# Patient Record
Sex: FEMALE | Race: WHITE
[De-identification: names, ages, dates, MRNs, and addresses within clinical notes are randomized per-mention and may not be internally consistent; named-entity substitution may affect disease eponyms.]

---

## 2019-10-25 ENCOUNTER — HOSPITAL ENCOUNTER (INPATIENT)
Dept: HOSPITAL 35 - SBH | Age: 84
LOS: 10 days | Discharge: NURSING, REHAB OR LONG-TERM CARE | DRG: 57 | End: 2019-11-04
Attending: PSYCHIATRY & NEUROLOGY | Admitting: PSYCHIATRY & NEUROLOGY
Payer: COMMERCIAL

## 2019-10-25 VITALS — BODY MASS INDEX: 22.86 KG/M2 | HEIGHT: 67 IN | WEIGHT: 145.63 LBS

## 2019-10-25 VITALS — DIASTOLIC BLOOD PRESSURE: 90 MMHG | SYSTOLIC BLOOD PRESSURE: 170 MMHG

## 2019-10-25 DIAGNOSIS — M81.0: ICD-10-CM

## 2019-10-25 DIAGNOSIS — Z88.7: ICD-10-CM

## 2019-10-25 DIAGNOSIS — Z88.0: ICD-10-CM

## 2019-10-25 DIAGNOSIS — I10: ICD-10-CM

## 2019-10-25 DIAGNOSIS — K21.9: ICD-10-CM

## 2019-10-25 DIAGNOSIS — Z88.1: ICD-10-CM

## 2019-10-25 DIAGNOSIS — Z23: ICD-10-CM

## 2019-10-25 DIAGNOSIS — F02.81: ICD-10-CM

## 2019-10-25 DIAGNOSIS — G30.9: Primary | ICD-10-CM

## 2019-10-25 DIAGNOSIS — F01.50: ICD-10-CM

## 2019-10-25 DIAGNOSIS — Z88.8: ICD-10-CM

## 2019-10-25 DIAGNOSIS — Z79.82: ICD-10-CM

## 2019-10-25 DIAGNOSIS — Z79.899: ICD-10-CM

## 2019-10-25 DIAGNOSIS — Z91.041: ICD-10-CM

## 2019-10-25 PROCEDURE — 10880: CPT

## 2019-10-26 VITALS — DIASTOLIC BLOOD PRESSURE: 85 MMHG | SYSTOLIC BLOOD PRESSURE: 135 MMHG

## 2019-10-26 VITALS — SYSTOLIC BLOOD PRESSURE: 131 MMHG | DIASTOLIC BLOOD PRESSURE: 77 MMHG

## 2019-10-26 VITALS — DIASTOLIC BLOOD PRESSURE: 82 MMHG | SYSTOLIC BLOOD PRESSURE: 143 MMHG

## 2019-10-26 VITALS — SYSTOLIC BLOOD PRESSURE: 143 MMHG | DIASTOLIC BLOOD PRESSURE: 82 MMHG

## 2019-10-26 NOTE — NUR
To toilet with assistance twice.
Awake and alert, orientated to self and place.
Denies pain, SI/HI.  Breath sounds clear t/o, bilaterally equal.  Color pink
with brisk capillary refill and palpable peripheral pulses. Regular HR
auscultated.  Active bowel sounds over soft, rounded abdomen.  Able to
ambulate a few steps in bathroom with support rails.  Family members here
visiting, appropriate questions and concerns.  Able to feed self at lunch.
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
## 2019-10-26 NOTE — NUR
Alert and cooperative.  Orientated x3.  No s/o distress.  Denies SI/HI, pain.
Conversive with peers and staff.  At times encouraging roomate to get out of
bed and dress for lunch.  Breath sounds clear t/o, bilaterally equal.  Color
pink with brisk capillary refill and palpable peripheral pulses.  Reg HR
auscultated.  Active bowel sounds over soft, rounded abdomen.  Heartburn at
1700, prn given per order.

## 2019-10-26 NOTE — NUR
ASSUMED CARE @ 19:20 ON 10/26/19.  IN W/C IN DAY ROOM.  TOILETED ON BSC,
VOIDED YELLOW URINE.  X2 TRANSFER FROM W/C TO BSC AND BACK.  HRRR, LUNGS CTA,
ABD NORMOACTIVE X 4 Q.  A&OX2.

## 2019-10-26 NOTE — NUR
The pt. c.o. pain in the nite low back and was given Hydrocodone/apap and it
was effective. She asked where her family was and redirected, assist of 2 to
the wheelchair, cooperative/soft spoken/not aggressive with cares and in the
day area for breakfast this morning.

## 2019-10-27 VITALS — SYSTOLIC BLOOD PRESSURE: 161 MMHG | DIASTOLIC BLOOD PRESSURE: 90 MMHG

## 2019-10-27 VITALS — DIASTOLIC BLOOD PRESSURE: 90 MMHG | SYSTOLIC BLOOD PRESSURE: 161 MMHG

## 2019-10-27 NOTE — NUR
THE PATIENT HAS BEEN IN THE DAY ROOM SITTING MOST OF THE DAY, QUIET AND CALM.
SHE HAS BEEN FOLLOWING ASLEEP IN HER CHAIR. SHE HAS BEEN COMPLIANT
AND COOPERATIVE WITH STAFF AND HER HEALTH CARE REGIMEN. THE PATIENT WAS QUITE
SLEEPY THROUGH OUT THE MORNING. THE PATIENT WAS ASSISTANT WITH HER LUNCH
TODAY. HER INTAKE WAS ABOUT 50% HER LUNGS ARE CLEAR TO AUSCULTATION. HEART
SOUNDS ARE REGULAR. SHE IS ALERT AND ORIENTED BUT CONFUSED. SHE C/O OF CHRONIC
BACK PAIN AND TYLENOL ADMINISTERED THREE TIMES A DAY. SEROQUEL DOSE CHANGED TO
12.5 MG. THE PATIENT IS WC BOUND. CONTINUE TO MONITOR.

## 2019-10-27 NOTE — NUR
TOOK HS MEDICATIONS AND COOPERATED WITH INSTILLATION OF EYE DROPS.  SLEEPING
AT THIS WRITING.  BED IN LOW POSITION, BED ALARM SET, WILL CONTINUE TO MONITOR
Q 12 MINUTES FOR PATIENT SAFETY.

## 2019-10-27 NOTE — NUR
ASSUMED CARE @ 19:15 ON 10/27/19.  SITTING AT A TABLE IN HER W/C IN THE DAY
ROOM.  CALM AND COOPERATIVE WITH ASSESSMENT.  DID NOT SPEAK IN RESPONSE TO
ASSESSMENT QUESTIONS.  TOOK WRIST ID BRACELETS OFF.  ALLOWED STAFF TO REPLACE
ID BAND.

## 2019-10-28 VITALS — SYSTOLIC BLOOD PRESSURE: 135 MMHG | DIASTOLIC BLOOD PRESSURE: 62 MMHG

## 2019-10-28 VITALS — DIASTOLIC BLOOD PRESSURE: 81 MMHG | SYSTOLIC BLOOD PRESSURE: 127 MMHG

## 2019-10-28 NOTE — NUR
Assumed care of patient this am. Patient in wheelchair in the mileu. Patient
responds to questions and states that she has pain in her back and leg.
Patient takes medications whole with fluids. Patients affect is relaxed.
Patient states that she would like to go home. Patients assessment shows clear
lung sounds bilaterally and active bowel sounds. Heart tones audible with
auscultation for s1 and s2.

## 2019-10-28 NOTE — NUR
JOYCE contacted pt's daughter and scheduled a family meeting for 10/31 at 11 am.
She said she will be bringing her  who is DPOA of pt.
 
SW team will continue to follow pt during his stay.

## 2019-10-29 VITALS — DIASTOLIC BLOOD PRESSURE: 52 MMHG | SYSTOLIC BLOOD PRESSURE: 112 MMHG

## 2019-10-29 VITALS — SYSTOLIC BLOOD PRESSURE: 115 MMHG | DIASTOLIC BLOOD PRESSURE: 94 MMHG

## 2019-10-29 VITALS — DIASTOLIC BLOOD PRESSURE: 81 MMHG | SYSTOLIC BLOOD PRESSURE: 127 MMHG

## 2019-10-29 NOTE — NUR
Pt had a relaxed afternoon at the table. Alwasy sleepy, calm and cooperative.
Lunch and dinner feeding asssited. Taking medication with no problem. W/C in
use. Tylenol given. Denies pain. Two people assist for cleaning and transfer
to be. Fall precautions in place. Will continue with plan of care.

## 2019-10-29 NOTE — NUR
Pt in the w/c. sleepy but easily arousable. Took medication without any
issues. Had breakfast with minimal help. Toileting and ambulation needs
help of x2 assist. pleasant, calm, and cooperative. lower left edema. Chronic
back pain. Tylenol administered. Denies pain at the moment. Will continue with
the plan of care.

## 2019-10-29 NOTE — NUR
ASSUMED CARE OF PATIENT ON 10/28/19 @ 19:15.  SEATED IN DAY ROOM IN W/C AT
TABLE.   HRRR, LUNGS CTA, ABD N X 4 Q.  REPORTS BM TODAY. HX OF BACK FX 3-4
YEARS AGO RESULTING IN CHRONIC BACK PAIN.  REPORTS PAIN LEVEL OF 6/10. NEW
SEROQUEL DOSE OF 25 MG @ 2100. TOOK MEDS WHOLE WITH WATER, ONE PILL AT A TIME.
DENIES SI, HI, A/V HALLUCINATIONS. RETIRED TO BED @ 2145 AND IS SLEEPING AT
THIS WRITING. WILL CONTINUE TO MONITOR Q 12 MINUTES FOR PATAIENT SAFETY.

## 2019-10-30 VITALS — DIASTOLIC BLOOD PRESSURE: 70 MMHG | SYSTOLIC BLOOD PRESSURE: 124 MMHG

## 2019-10-30 VITALS — DIASTOLIC BLOOD PRESSURE: 69 MMHG | SYSTOLIC BLOOD PRESSURE: 133 MMHG

## 2019-10-30 VITALS — SYSTOLIC BLOOD PRESSURE: 124 MMHG | DIASTOLIC BLOOD PRESSURE: 70 MMHG

## 2019-10-30 NOTE — NUR
PT AWAKE, INCONTINENT OF URINE AND THEN VOIDED IN BSC. PT NOW SITTING IN
WHEELCHAIR IN DINING ROOM. AWAKE AND ALERT THIS MORNING.

## 2019-10-30 NOTE — NUR
ASSUMED PT CARE AROUND 1900. PT SAT IN WHEELCHAIR IN DINING ROOM AT BEGINNING
OF SHIFT. PT HAS BEEN COOPERATIVE WITH NURSING CARE THIS SHIFT. PT SLEPT MOST
OF THE NIGHT. SHE WAS RESTLESS WHEN SHE NEEDED TO URINATE DURING THE NIGHT.
VSS. FALL PRECAUTIONS IN PLACE. PROGRESSING SLOWLY TOWARD POC GOALS.

## 2019-10-30 NOTE — NUR
ASSUMED PATIENT CARE AT 0700. ALERT TO SELF. AGITAED AND RESTLESS. MAX
ASSISTED TO BSC.NOT COOPERRATED. PATIENT SLOWLY TOWARDS POC GOALS.

## 2019-10-30 NOTE — NUR
SW received a call from [facility] who said they will take pt back however
they need two things from the psych doctor; a qualifying diagnosis for them to
administer Seroquel, and the psych doctor to either deem pt competent or
unable to make her own decisions as her DPOA paperwork reads only a psych
doctor can do so.
 
JOYCE emailed the psych doctor with this information.
 
SW team will continue to follow pt during her stay in the hospital.

## 2019-10-31 VITALS — SYSTOLIC BLOOD PRESSURE: 126 MMHG | DIASTOLIC BLOOD PRESSURE: 72 MMHG

## 2019-10-31 VITALS — DIASTOLIC BLOOD PRESSURE: 75 MMHG | SYSTOLIC BLOOD PRESSURE: 136 MMHG

## 2019-10-31 NOTE — NUR
PATIENT HAS BEEN SITTING UP IN RECLINER IN THE DINING ROOM TONIGHT. SHE
REMOVED HER NONSLIP SOCKS. SHE DENIES PAIN. SHE DID TAKE HER MEDS WITH SOME
ASSISTANCE OF THIS NURSE HELPING HER HOLD THE CUP OF WATER. PATIENT WAS HELPED
TO BED WITH ASSITANCE OF 2. SHE VOIDED IN BSC AND WAS CLEANED AND NEW BRIEF
APPLIED BEFORE PUTTING HER INTO BED. SHE DOES GET IMPULSIVE AND RESTLESS AT
TIMES AND HAD TO BE TOLD TO LAY DOWN AND GO TO SLEEP. PATIENT HAS BEEN
COMPLIANT TONIGHT AND IN PLEASANT MOOD. SHE IS A/O X 1. BED IN LOW POSITION
AND BED ALARM ON. PATIENT SLEEPING.

## 2019-10-31 NOTE — NUR
0700: Report rec from noc shift, care assumed.
[id number]: Mobile via w/c to DR, sits in recliner for positioning and comfort,
episodes of restlessness and agitation noted while in recliner. Maximum
assistance required for eating, ADL's, appetite poor, takes fluids small sips
at a time. Lap yasir in place to remind pt to wait for assistance and not
transfer alone. Takes meds crushed in yogurt, attempts to spit out meds,
followed by water, pt swallows w/o difficulty. Attneds 0900 therapy group w/o
participation noted, not disruptive to group.

## 2019-10-31 NOTE — NUR
GOT PATIENT UP AND TO SIT IN DINING ROOM. SHE SPENT AN HOUR TALKING TO HERSELF
AND TAKING HER CLOTHES OFF IN BED. SHE HAS BEEN HALLUCINATING AND TALKING TO
HER MOM AND SEEING THINGS ON THE CEILING AND WALLS. I CANNOT GET HER TO TELL
ME WHAT SHE IS SEEING BUT SHE GETS UPSET BECAUSE I CANNOT SEE THEM. SHE WAS
LOOKING AT THE WALL AND COUNTING SOMETHING OUT LOUD THAT SHE WAS SEEING.
PATIENT TRYING TO GET OUT OF BED. PATIENT WAS INCONTINENT SO SHE WAS CLEANED
AND NEW CLOTHES PUT ON. BARRIER CREAM TO SLIGHT PINK /RED AREA ON BUTTOCKS.
OLANZAPINE 2.5MG GIVEN PO. AROUND 0415. PATIENT IS UP IN A CHAIR IN THE DINING
ROOM AND FIDGETING CALMLY WITH HER SHIRT AND SOCKS. PATIENT APPEARS SLEEPY.
FEET ARE ELEVATED. CONTINUING TO MONITOR.

## 2019-10-31 NOTE — NUR
Care assumed of patient at 1915: Patient seated at table at start of shift.
Patient alert and oriented to person.  Patient confused and forgetful on
location, time and situation.  When patient is re-orientated, she states "now
you know I'm not stupid".  Patient restless and easily agitated.  Patient took
HS medication whole without difficulty.  Patient ate 100% HS snack.  Patient
denies SI/HI/AH/VH.  Patient does report that she has to get to work and "get
the felt".  Unsure as to what she is referring too.  Patient continent of
bladder to bedside commode.  Patient assisted to bed with staff x1.  Patient
restless once in bed.  Looking and pointing at the ceiling and walls, mumbling
words that were not audible.  Unsure if she is having VH.  Patient restless in
bed and took awhile to get to sleep.  She is now resting quietly in bed.

## 2019-11-01 VITALS — SYSTOLIC BLOOD PRESSURE: 155 MMHG | DIASTOLIC BLOOD PRESSURE: 93 MMHG

## 2019-11-01 VITALS — DIASTOLIC BLOOD PRESSURE: 84 MMHG | SYSTOLIC BLOOD PRESSURE: 119 MMHG

## 2019-11-01 NOTE — NUR
SW spoke with pt's DPOA and provided an update regarding the psych doctor
enacting pt's DPOA, and her return to [facility] on Monday.
 
SW team will continue to follow pt during her stay.

## 2019-11-01 NOTE — NUR
0900
Sleepy this AM, arouses to tactile and verbal stimuli but then goes right back
to sleep.  Orientated to self.  Obeys simple commands.  Calls out "My back
hurts" when turning and getting up to WC, resolves once movement stops.
Confused speech to stimuli.  Ate very little breakfast but was able to take
oral meds without difficulty.  Breath sounds clear t/o, bilaterally equal.
Color pink with brisk capillary refill.  Reg HR auscultated.  +1-2 edema in
lower extremities.  Active bowel sounds over soft, rounded abdoment.
 
1000
Continues to be very drowsy at table but did reach up to take another
patient's breakfast.  Obeys simple commands with alot of stimulation.  Sitting
in WC with head down and to right.  Slouched.  Placed in recliner for comfort,
continued observation.
 
1200
CNA Renetta concerned about patient's lack of appetite and decreased LOC.  VS
done.  Pt. very sleepy.  Able to take several bites with alot of
encouragement.  Did open eyes and focus briefly with tactile stimulation.
Pupils equal and briskly reactive, about 2 mm.   very weak but equal.
Dr. Austin here assessing patient, will decrease seraquel dosage.  Patient
placed in be with 2 person assist.  When in bed awakened briefly, stated she
needed to go to bathroom.  Placed on commode, able to urinate fair amount
without difficulty.  Placed back in bed with SR up.  Sleeping without s/o
distress.  PO tylenol not given d/t excessive lethargy.
 
1530
States she is cold, additional blanket placed on her.  T 97.7 PO.  No s/o
distress.

## 2019-11-01 NOTE — NUR
SW team faxed a copy of the letter enacting pt's DPOA to Rashad Petit. Pt is
due to return to her facility 11/4 @ 1pm.
 
SW team will continue to follow pt during her stay.

## 2019-11-01 NOTE — NUR
Date of Admission: 10/25/19
Date of Activity Therapy Assessment: 10/28/19
Activity Goal: Increase engagement
Initial Goal: 1 Group activity/day
Weekly progress towards goal: Did not achieve goals
Group participation level: Needs some assistance
Behaviors observed: Patient is not actively participating in groups at this
time. She is often drowsy and difficult to arouse during attempts for
participation. Patient appears calm in the milieu.
Plan: No change towards goal

## 2019-11-02 VITALS — DIASTOLIC BLOOD PRESSURE: 63 MMHG | SYSTOLIC BLOOD PRESSURE: 99 MMHG

## 2019-11-02 VITALS — SYSTOLIC BLOOD PRESSURE: 96 MMHG | DIASTOLIC BLOOD PRESSURE: 51 MMHG

## 2019-11-02 VITALS — DIASTOLIC BLOOD PRESSURE: 51 MMHG | SYSTOLIC BLOOD PRESSURE: 96 MMHG

## 2019-11-02 NOTE — NUR
Alert and orientated x 2.  Sitting up in WC without s/o distress.  Denies
SI/HI. States lower back hurts, scheduled tylenol given with partial relief.
Initially reluctant to taking seraquel, compliant with second request to take
meds.  Breath sounds clear t/o, bilaterally equal.  Reg HR auscultated.  Color
pink with brisk capillary refill and palpable peripheral pulses.  Repeat BP
122/43.  Clear urine with small stool per commode.
 
1300
Very sleepy after lunch.  Sleeping in WC with head lowered.  Placed in
recliner.  No s/o distress.
 
1500
Sleeping in recliner without s/o distress.  Awakens easily and follows simple
commands.

## 2019-11-02 NOTE — NUR
ASSUMED CARE @ 19:15 ON 11/01/19.  SITTING IN DAY ROOM IN W/C WITH LAP ROMARIO
ON.  REPORTS BM TODAY, ABD NORMOACTIVE X 4 Q.  HRRR, LUNGS CTA, FLUID
RESTRICTION IN PLACE, THIN WATER.

## 2019-11-02 NOTE — NUR
MEDS GIVEN WHOLE WIHT HONEY THICKENED LIQUIDS.  FSBS 198, PT  % OF
EVENING MEAL, 18 UNITS OF GLARGINE/LANTUS PROVIDED.  PT TRANSFERRED TO BED X 2
ASSIST.  A&OX2 ONLY.  BED IN LOW POSITION, BED ALARM SET, WILL CONTINUE TO
MONITOR Q 12 MINUTES FOR PATIENT SAFETY.

## 2019-11-02 NOTE — NUR
1910-Report received from day shift nurse and care assumed. Mita was in
the day gunn in a wheelchair watching activities and alert. She repsponded to
questions with word salad speech, and with a bright affect, short grin at
times and responding to visual hallucinations on table, chair and floor. She
was med. compliant with HS meds., had no c.o. pain voiced. She was not
aggressive with care/ADL's and slept sound tonite awakening once to sit up in
bed.

## 2019-11-03 VITALS — DIASTOLIC BLOOD PRESSURE: 58 MMHG | SYSTOLIC BLOOD PRESSURE: 111 MMHG

## 2019-11-03 VITALS — SYSTOLIC BLOOD PRESSURE: 113 MMHG | DIASTOLIC BLOOD PRESSURE: 69 MMHG

## 2019-11-03 NOTE — NUR
PATIENT HAS BEEN OUT ON UNIT MOST OF THE DAY. LAP ROMARIO REMAINS ON SINCE
PATIENT IMPULSIVE AND ATTEMPTS TO GET UP AND AMBULATE - THOUGH UNSTEADY.
PATIENT ATE MOST OF HER BREAKFAST AND LUNCH. OBSERVED AFTERNOON GROUP BUT
LITTLE PARTICIPATEION. FIXATED ON STYROPOME PLATE SHE WAS PLAYING WITH.
PATIENT CALM AND AGREEABLE EARLY IN MORNING. AFTERWARDS MANUVERED HERSELF TO
EXIT DOORS AND STARTED YELLING AND RESTLESS TO GET OUT. STAFF EASILY
REDIRECTED BACK TO DINING AREA AND ABLE TO DISTRACT. AS DAY PROGRESSED SHE
REMAINED CALM AND NO FURTHER DIRUPTIVENESS NOTED. TAKES MEDICATIONS BUT
RELUCTANTLY. IF MEDICATIONS EXPLAINED TO HER SHE WILL TAKE ONE PILL AT A TIME.

## 2019-11-04 VITALS — SYSTOLIC BLOOD PRESSURE: 107 MMHG | DIASTOLIC BLOOD PRESSURE: 79 MMHG

## 2019-11-04 NOTE — NUR
0710: Report rec from noc shift, care assumed.
[id number]: Mobile via w/c, self propels occasionally, alert to name only,
feeds self with set-up assist, appetite fair, cooperative with staff, smiles
with staff interaction. Takes meds whole w/o difficutly. Attends 0900 therapy
group, unable to participate due to cognitive impairment. Pleasantly confused,
asking staff and other pts about getting her car and ask staff "is that water
cpming down the water spout." Pt easily re-directs when pt is noted removing
lap yasir or with assist during meals and ADL's.

## 2019-11-04 NOTE — NUR
HOLGER D/C note
 
Holger received a call from Rosangela with [facility] confirming that pt is still
good to go to facility today at 1pm. She explained their drivers are out so
they are sending [employer] to retrieve pt.
 
HOLGER faxed d/c paperwork to [phone].
 
No other needs for HOLGER team to address at this time.
 
[address] [place] [address]
[phone]
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
